# Patient Record
Sex: FEMALE | Race: WHITE | NOT HISPANIC OR LATINO | URBAN - METROPOLITAN AREA
[De-identification: names, ages, dates, MRNs, and addresses within clinical notes are randomized per-mention and may not be internally consistent; named-entity substitution may affect disease eponyms.]

---

## 2017-12-15 LAB — POC BETA-HCG (QUAL): NEGATIVE

## 2019-12-06 LAB — POC BETA-HCG (QUAL): NEGATIVE

## 2022-04-11 ENCOUNTER — HISTORICAL (OUTPATIENT)
Dept: ADMINISTRATIVE | Facility: HOSPITAL | Age: 32
End: 2022-04-11

## 2022-04-29 VITALS
DIASTOLIC BLOOD PRESSURE: 88 MMHG | BODY MASS INDEX: 18.19 KG/M2 | SYSTOLIC BLOOD PRESSURE: 134 MMHG | HEIGHT: 61 IN | WEIGHT: 96.31 LBS

## 2022-08-30 PROBLEM — Z34.90 SUPERVISION OF NORMAL PREGNANCY: Status: ACTIVE | Noted: 2022-08-30

## 2022-08-30 PROBLEM — Z98.891 HISTORY OF 2 CESAREAN SECTIONS: Status: ACTIVE | Noted: 2022-08-30

## 2022-08-30 PROBLEM — F17.200 SMOKER: Status: ACTIVE | Noted: 2022-08-30

## 2022-08-30 PROBLEM — J34.89 NASAL MASS: Status: ACTIVE | Noted: 2022-08-30

## 2022-09-22 ENCOUNTER — HISTORICAL (OUTPATIENT)
Dept: ADMINISTRATIVE | Facility: HOSPITAL | Age: 32
End: 2022-09-22

## 2023-06-01 ENCOUNTER — HOSPITAL ENCOUNTER (EMERGENCY)
Facility: HOSPITAL | Age: 33
Discharge: HOME | End: 2023-06-01
Attending: EMERGENCY MEDICINE
Payer: MEDICAID

## 2023-06-01 VITALS
SYSTOLIC BLOOD PRESSURE: 129 MMHG | OXYGEN SATURATION: 97 % | TEMPERATURE: 98 F | BODY MASS INDEX: 21.34 KG/M2 | HEIGHT: 64 IN | DIASTOLIC BLOOD PRESSURE: 88 MMHG | RESPIRATION RATE: 16 BRPM | HEART RATE: 92 BPM | WEIGHT: 125 LBS

## 2023-06-01 DIAGNOSIS — S39.91XA BLUNT TRAUMATIC INJURY OF THORACO-ABDOMINO-PELVIC REGION: Primary | ICD-10-CM

## 2023-06-01 DIAGNOSIS — T14.90XA TRAUMA: ICD-10-CM

## 2023-06-01 DIAGNOSIS — S80.02XA CONTUSION OF LEFT KNEE, INITIAL ENCOUNTER: ICD-10-CM

## 2023-06-01 DIAGNOSIS — S39.93XA BLUNT TRAUMATIC INJURY OF THORACO-ABDOMINO-PELVIC REGION: Primary | ICD-10-CM

## 2023-06-01 DIAGNOSIS — S29.9XXA BLUNT TRAUMATIC INJURY OF THORACO-ABDOMINO-PELVIC REGION: Primary | ICD-10-CM

## 2023-06-01 DIAGNOSIS — F10.920 ALCOHOLIC INTOXICATION WITHOUT COMPLICATION: ICD-10-CM
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
## 2023-06-01 LAB
ALBUMIN SERPL-MCNC: 4.4 G/DL (ref 3.5–5)
ALBUMIN/GLOB SERPL: 1.3 RATIO (ref 1.1–2)
ALP SERPL-CCNC: 82 UNIT/L (ref 40–150)
ALT SERPL-CCNC: 31 UNIT/L (ref 0–55)
APAP SERPL-MCNC: <17.4 UG/ML (ref 17.4–30)
APPEARANCE UR: CLEAR
AST SERPL-CCNC: 33 UNIT/L (ref 5–34)
B-HCG SERPL QL: NEGATIVE
BACTERIA #/AREA URNS AUTO: NORMAL /HPF
BASOPHILS # BLD AUTO: 0.07 X10(3)/MCL
BASOPHILS NFR BLD AUTO: 0.5 %
BILIRUB UR QL STRIP.AUTO: NEGATIVE MG/DL
BILIRUBIN DIRECT+TOT PNL SERPL-MCNC: 0.2 MG/DL
BUN SERPL-MCNC: 11.3 MG/DL (ref 7–18.7)
CALCIUM SERPL-MCNC: 9.8 MG/DL (ref 8.4–10.2)
CHLORIDE SERPL-SCNC: 110 MMOL/L (ref 98–107)
CO2 SERPL-SCNC: 19 MMOL/L (ref 22–29)
COLOR UR: YELLOW
CREAT SERPL-MCNC: 0.75 MG/DL (ref 0.55–1.02)
EOSINOPHIL # BLD AUTO: 0.14 X10(3)/MCL (ref 0–0.9)
EOSINOPHIL NFR BLD AUTO: 1 %
ERYTHROCYTE [DISTWIDTH] IN BLOOD BY AUTOMATED COUNT: 13.6 % (ref 11.5–17)
ETHANOL SERPL-MCNC: 144 MG/DL
GFR SERPLBLD CREATININE-BSD FMLA CKD-EPI: >60 MLS/MIN/1.73/M2
GLOBULIN SER-MCNC: 3.3 GM/DL (ref 2.4–3.5)
GLUCOSE SERPL-MCNC: 113 MG/DL (ref 74–100)
GLUCOSE UR QL STRIP.AUTO: NEGATIVE MG/DL
HCT VFR BLD AUTO: 42.1 % (ref 37–47)
HGB BLD-MCNC: 14.2 G/DL (ref 12–16)
IMM GRANULOCYTES # BLD AUTO: 0.04 X10(3)/MCL (ref 0–0.04)
IMM GRANULOCYTES NFR BLD AUTO: 0.3 %
KETONES UR QL STRIP.AUTO: NEGATIVE MG/DL
LEUKOCYTE ESTERASE UR QL STRIP.AUTO: NEGATIVE UNIT/L
LYMPHOCYTES # BLD AUTO: 5.58 X10(3)/MCL (ref 0.6–4.6)
LYMPHOCYTES NFR BLD AUTO: 41.2 %
MCH RBC QN AUTO: 31.4 PG (ref 27–31)
MCHC RBC AUTO-ENTMCNC: 33.7 G/DL (ref 33–36)
MCV RBC AUTO: 93.1 FL (ref 80–94)
MONOCYTES # BLD AUTO: 0.78 X10(3)/MCL (ref 0.1–1.3)
MONOCYTES NFR BLD AUTO: 5.8 %
NEUTROPHILS # BLD AUTO: 6.92 X10(3)/MCL (ref 2.1–9.2)
NEUTROPHILS NFR BLD AUTO: 51.2 %
NITRITE UR QL STRIP.AUTO: NEGATIVE
NRBC BLD AUTO-RTO: 0 %
PH UR STRIP.AUTO: 7 [PH]
PLATELET # BLD AUTO: 341 X10(3)/MCL (ref 130–400)
PMV BLD AUTO: 10.5 FL (ref 7.4–10.4)
POTASSIUM SERPL-SCNC: 4 MMOL/L (ref 3.5–5.1)
PROT SERPL-MCNC: 7.7 GM/DL (ref 6.4–8.3)
PROT UR QL STRIP.AUTO: NEGATIVE MG/DL
RBC # BLD AUTO: 4.52 X10(6)/MCL (ref 4.2–5.4)
RBC #/AREA URNS AUTO: NORMAL /HPF
RBC UR QL AUTO: NEGATIVE UNIT/L
SODIUM SERPL-SCNC: 141 MMOL/L (ref 136–145)
SP GR UR STRIP.AUTO: 1 (ref 1–1.03)
SQUAMOUS #/AREA URNS AUTO: NORMAL /HPF
TSH SERPL-ACNC: 2.58 UIU/ML (ref 0.35–4.94)
UROBILINOGEN UR STRIP-ACNC: 0.2 MG/DL
WBC # SPEC AUTO: 13.53 X10(3)/MCL (ref 4.5–11.5)
WBC #/AREA URNS AUTO: NORMAL /HPF

## 2023-06-01 PROCEDURE — 82077 ASSAY SPEC XCP UR&BREATH IA: CPT | Performed by: EMERGENCY MEDICINE

## 2023-06-01 PROCEDURE — 96374 THER/PROPH/DIAG INJ IV PUSH: CPT

## 2023-06-01 PROCEDURE — 63600175 PHARM REV CODE 636 W HCPCS: Performed by: EMERGENCY MEDICINE

## 2023-06-01 PROCEDURE — 99285 EMERGENCY DEPT VISIT HI MDM: CPT | Mod: 25

## 2023-06-01 PROCEDURE — 85025 COMPLETE CBC W/AUTO DIFF WBC: CPT | Performed by: EMERGENCY MEDICINE

## 2023-06-01 PROCEDURE — 80053 COMPREHEN METABOLIC PANEL: CPT | Performed by: EMERGENCY MEDICINE

## 2023-06-01 PROCEDURE — 84443 ASSAY THYROID STIM HORMONE: CPT | Performed by: EMERGENCY MEDICINE

## 2023-06-01 PROCEDURE — 25500020 PHARM REV CODE 255: Performed by: EMERGENCY MEDICINE

## 2023-06-01 PROCEDURE — 80143 DRUG ASSAY ACETAMINOPHEN: CPT | Performed by: EMERGENCY MEDICINE

## 2023-06-01 PROCEDURE — 81001 URINALYSIS AUTO W/SCOPE: CPT | Mod: 59 | Performed by: EMERGENCY MEDICINE

## 2023-06-01 PROCEDURE — 81025 URINE PREGNANCY TEST: CPT | Performed by: EMERGENCY MEDICINE

## 2023-06-01 RX ORDER — KETOROLAC TROMETHAMINE 10 MG/1
10 TABLET, FILM COATED ORAL EVERY 6 HOURS PRN
Qty: 20 TABLET | Refills: 0 | Status: SHIPPED | OUTPATIENT
Start: 2023-06-01 | End: 2023-06-06

## 2023-06-01 RX ORDER — METHOCARBAMOL 500 MG/1
1000 TABLET, FILM COATED ORAL 3 TIMES DAILY
Qty: 30 TABLET | Refills: 0 | Status: SHIPPED | OUTPATIENT
Start: 2023-06-01 | End: 2023-06-06

## 2023-06-01 RX ORDER — LORAZEPAM 2 MG/ML
1 INJECTION INTRAMUSCULAR
Status: COMPLETED | OUTPATIENT
Start: 2023-06-01 | End: 2023-06-01

## 2023-06-01 RX ADMIN — LORAZEPAM 1 MG: 2 INJECTION INTRAMUSCULAR; INTRAVENOUS at 04:06

## 2023-06-01 RX ADMIN — IOPAMIDOL 100 ML: 755 INJECTION, SOLUTION INTRAVENOUS at 05:06
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
## 2024-07-13 PROBLEM — F39 UNSPECIFIED MOOD (AFFECTIVE) DISORDER: Status: ACTIVE | Noted: 2024-07-13

## 2024-07-30 DIAGNOSIS — Z32.01 POSITIVE PREGNANCY TEST: Primary | ICD-10-CM

## 2024-08-08 ENCOUNTER — PATIENT MESSAGE (OUTPATIENT)
Dept: OBSTETRICS AND GYNECOLOGY | Facility: CLINIC | Age: 34
End: 2024-08-08
Payer: COMMERCIAL

## 2024-08-12 ENCOUNTER — PATIENT MESSAGE (OUTPATIENT)
Dept: URGENT CARE | Facility: CLINIC | Age: 34
End: 2024-08-12
Payer: COMMERCIAL

## 2024-08-13 ENCOUNTER — CLINICAL SUPPORT (OUTPATIENT)
Dept: OBSTETRICS AND GYNECOLOGY | Facility: CLINIC | Age: 34
End: 2024-08-13
Payer: COMMERCIAL

## 2024-08-13 DIAGNOSIS — Z71.9 COUNSELED BY NURSE: Primary | ICD-10-CM

## 2024-08-13 NOTE — PROGRESS NOTES
Spoke with patient , went to er yesterday said , she was told she has a dead fetus inside her , she wants to be seen at [facility] , to get a second opinion , has an appointment at [facility] Thursday but wants to keep her scheduled appointment on Thursday. Advised to go to the er to be re-evaluated . Patient said she had court tomorrow and will be in [place], will go once she returns home

## 2024-08-20 ENCOUNTER — HOSPITAL ENCOUNTER (EMERGENCY)
Facility: OTHER | Age: 34
Discharge: HOME | End: 2024-08-20
Attending: EMERGENCY MEDICINE
Payer: COMMERCIAL

## 2024-08-20 VITALS
DIASTOLIC BLOOD PRESSURE: 60 MMHG | SYSTOLIC BLOOD PRESSURE: 110 MMHG | HEART RATE: 77 BPM | RESPIRATION RATE: 19 BRPM | OXYGEN SATURATION: 97 % | TEMPERATURE: 98 F

## 2024-08-20 DIAGNOSIS — O03.9 MISCARRIAGE: ICD-10-CM

## 2024-08-20 DIAGNOSIS — O20.9 VAGINAL BLEEDING IN PREGNANCY, FIRST TRIMESTER: Primary | ICD-10-CM

## 2024-08-20 LAB
ALBUMIN SERPL BCP-MCNC: 3.4 G/DL (ref 3.5–5.2)
ALP SERPL-CCNC: 58 U/L (ref 55–135)
ALT SERPL W/O P-5'-P-CCNC: 15 U/L (ref 10–44)
ANION GAP SERPL CALC-SCNC: 8 MMOL/L (ref 8–16)
AST SERPL-CCNC: 17 U/L (ref 10–40)
BASOPHILS # BLD AUTO: 0.04 K/UL (ref 0–0.2)
BASOPHILS NFR BLD: 0.3 % (ref 0–1.9)
BILIRUB SERPL-MCNC: 0.3 MG/DL (ref 0.1–1)
BUN SERPL-MCNC: 10 MG/DL (ref 6–20)
CALCIUM SERPL-MCNC: 8.7 MG/DL (ref 8.7–10.5)
CHLORIDE SERPL-SCNC: 110 MMOL/L (ref 95–110)
CO2 SERPL-SCNC: 21 MMOL/L (ref 23–29)
CREAT SERPL-MCNC: 0.6 MG/DL (ref 0.5–1.4)
DIFFERENTIAL METHOD BLD: ABNORMAL
EOSINOPHIL # BLD AUTO: 0.2 K/UL (ref 0–0.5)
EOSINOPHIL NFR BLD: 1.6 % (ref 0–8)
ERYTHROCYTE [DISTWIDTH] IN BLOOD BY AUTOMATED COUNT: 13.6 % (ref 11.5–14.5)
EST. GFR  (NO RACE VARIABLE): >60 ML/MIN/1.73 M^2
GLUCOSE SERPL-MCNC: 89 MG/DL (ref 70–110)
HCG INTACT+B SERPL-ACNC: 1934 MIU/ML
HCT VFR BLD AUTO: 37 % (ref 37–48.5)
HGB BLD-MCNC: 12.8 G/DL (ref 12–16)
IMM GRANULOCYTES # BLD AUTO: 0.04 K/UL (ref 0–0.04)
IMM GRANULOCYTES NFR BLD AUTO: 0.3 % (ref 0–0.5)
LYMPHOCYTES # BLD AUTO: 3.8 K/UL (ref 1–4.8)
LYMPHOCYTES NFR BLD: 30.2 % (ref 18–48)
MCH RBC QN AUTO: 32.2 PG (ref 27–31)
MCHC RBC AUTO-ENTMCNC: 34.6 G/DL (ref 32–36)
MCV RBC AUTO: 93 FL (ref 82–98)
MONOCYTES # BLD AUTO: 1 K/UL (ref 0.3–1)
MONOCYTES NFR BLD: 8 % (ref 4–15)
NEUTROPHILS # BLD AUTO: 7.5 K/UL (ref 1.8–7.7)
NEUTROPHILS NFR BLD: 59.6 % (ref 38–73)
NRBC BLD-RTO: 0 /100 WBC
PLATELET # BLD AUTO: 329 K/UL (ref 150–450)
PMV BLD AUTO: 9.5 FL (ref 9.2–12.9)
POTASSIUM SERPL-SCNC: 3.7 MMOL/L (ref 3.5–5.1)
PROT SERPL-MCNC: 6.4 G/DL (ref 6–8.4)
RBC # BLD AUTO: 3.97 M/UL (ref 4–5.4)
SODIUM SERPL-SCNC: 139 MMOL/L (ref 136–145)
WBC # BLD AUTO: 12.63 K/UL (ref 3.9–12.7)

## 2024-08-20 PROCEDURE — 84702 CHORIONIC GONADOTROPIN TEST: CPT | Performed by: EMERGENCY MEDICINE

## 2024-08-20 PROCEDURE — 99285 EMERGENCY DEPT VISIT HI MDM: CPT | Mod: 25

## 2024-08-20 PROCEDURE — 96374 THER/PROPH/DIAG INJ IV PUSH: CPT

## 2024-08-20 PROCEDURE — 96375 TX/PRO/DX INJ NEW DRUG ADDON: CPT

## 2024-08-20 PROCEDURE — 80053 COMPREHEN METABOLIC PANEL: CPT | Performed by: EMERGENCY MEDICINE

## 2024-08-20 PROCEDURE — 88305 TISSUE EXAM BY PATHOLOGIST: CPT | Mod: 26,,, | Performed by: PATHOLOGY

## 2024-08-20 PROCEDURE — 88305 TISSUE EXAM BY PATHOLOGIST: CPT | Performed by: PATHOLOGY

## 2024-08-20 PROCEDURE — 25000003 PHARM REV CODE 250: Performed by: EMERGENCY MEDICINE

## 2024-08-20 PROCEDURE — 85025 COMPLETE CBC W/AUTO DIFF WBC: CPT | Performed by: EMERGENCY MEDICINE

## 2024-08-20 PROCEDURE — 63600175 PHARM REV CODE 636 W HCPCS: Performed by: EMERGENCY MEDICINE

## 2024-08-20 PROCEDURE — 96361 HYDRATE IV INFUSION ADD-ON: CPT

## 2024-08-20 RX ORDER — MISOPROSTOL 200 UG/1
800 TABLET ORAL DAILY PRN
Qty: 8 TABLET | Refills: 0 | Status: SHIPPED | OUTPATIENT
Start: 2024-08-20

## 2024-08-20 RX ORDER — OXYCODONE AND ACETAMINOPHEN 5; 325 MG/1; MG/1
1 TABLET ORAL EVERY 8 HOURS PRN
Qty: 9 TABLET | Refills: 0 | Status: SHIPPED | OUTPATIENT
Start: 2024-08-20

## 2024-08-20 RX ORDER — ONDANSETRON 4 MG/1
4 TABLET, ORALLY DISINTEGRATING ORAL EVERY 8 HOURS PRN
Qty: 10 TABLET | Refills: 0 | Status: SHIPPED | OUTPATIENT
Start: 2024-08-20

## 2024-08-20 RX ORDER — ONDANSETRON 4 MG/1
4 TABLET, ORALLY DISINTEGRATING ORAL EVERY 6 HOURS PRN
Qty: 10 TABLET | Refills: 0 | Status: SHIPPED | OUTPATIENT
Start: 2024-08-20 | End: 2024-08-20

## 2024-08-20 RX ORDER — MISOPROSTOL 200 UG/1
800 TABLET ORAL DAILY PRN
Qty: 8 TABLET | Refills: 0 | Status: SHIPPED | OUTPATIENT
Start: 2024-08-20 | End: 2024-08-20

## 2024-08-20 RX ORDER — IBUPROFEN 600 MG/1
600 TABLET ORAL EVERY 6 HOURS PRN
Qty: 30 TABLET | Refills: 1 | Status: SHIPPED | OUTPATIENT
Start: 2024-08-20 | End: 2024-08-20

## 2024-08-20 RX ORDER — KETOROLAC TROMETHAMINE 30 MG/ML
10 INJECTION, SOLUTION INTRAMUSCULAR; INTRAVENOUS
Status: COMPLETED | OUTPATIENT
Start: 2024-08-20 | End: 2024-08-20

## 2024-08-20 RX ORDER — IBUPROFEN 600 MG/1
600 TABLET ORAL EVERY 8 HOURS PRN
Qty: 20 TABLET | Refills: 0 | Status: SHIPPED | OUTPATIENT
Start: 2024-08-20

## 2024-08-20 RX ORDER — MORPHINE SULFATE 4 MG/ML
4 INJECTION, SOLUTION INTRAMUSCULAR; INTRAVENOUS
Status: COMPLETED | OUTPATIENT
Start: 2024-08-20 | End: 2024-08-20

## 2024-08-20 RX ADMIN — KETOROLAC TROMETHAMINE 10 MG: 30 INJECTION, SOLUTION INTRAMUSCULAR; INTRAVENOUS at 01:08

## 2024-08-20 RX ADMIN — SODIUM CHLORIDE 1000 ML: 9 INJECTION, SOLUTION INTRAVENOUS at 01:08

## 2024-08-20 RX ADMIN — MORPHINE SULFATE 4 MG: 4 INJECTION, SOLUTION INTRAMUSCULAR; INTRAVENOUS at 01:08

## 2024-08-20 NOTE — ED PROVIDER NOTES
Encounter Date: 2024    SCRIBE #1 NOTE: I, Gini Rader, am scribing for, and in the presence of,  Javi Langley MD. I have scribed the following portions of the note - Other sections scribed: HPI, ROS, PE.       History     Chief Complaint   Patient presents with    Vaginal Bleeding     Pt 10 weeks pregnant and reporting spotting x 1 day that has progressed to bright red bleeding. Denies passing clots. Pt also reporting lower abdominal pain. A1.      Time seen by provider: 12:59 PM    This is a 33 y.o. female  who presents with complaint of vaginal bleeding and cramping. The patient states that she was seen 8 days ago at [facility] for lower abdominal cramping and was informed she was likely having a miscarriage. She states that last night she began having continued cramping and new onset of bleeding and decided to come into the ED today. She states that she is also passing clots that started on arrival here. She notes that she is in a significant amount of pain that she describes as feeling like contractions. She denies having any trauma to the area that could have caused her symptoms. This is the extent of the patient's complaints at this time.    The history is provided by the patient.     Review of patient's allergies indicates:  No Known Allergies  Past Medical History:   Diagnosis Date    Nasal mass      Past Surgical History:   Procedure Laterality Date    AUGMENTATION OF BREAST  2022     SECTION      x2     No family history on file.  Social History     Tobacco Use    Smoking status: Former     Types: Cigarettes    Smokeless tobacco: Never   Substance Use Topics    Alcohol use: Never    Drug use: Never     Review of Systems   Constitutional:  Negative for fever.   HENT:  Negative for congestion.    Eyes:  Negative for redness.   Respiratory:  Negative for shortness of breath.    Cardiovascular:  Negative for chest pain.   Gastrointestinal:  Negative for abdominal pain.    Genitourinary:  Positive for pelvic pain and vaginal bleeding. Negative for dysuria.   Skin:  Negative for rash.   Neurological:  Negative for headaches.   Psychiatric/Behavioral:  Negative for confusion.        Physical Exam     Initial Vitals [08/20/24 1220]   BP Pulse Resp Temp SpO2   106/66 88 16 98.2 °F (36.8 °C) 98 %      MAP       --         Physical Exam    Constitutional: She appears well-developed and well-nourished. She is not diaphoretic.   Mild distress.    HENT:   Head: Normocephalic and atraumatic.   Eyes: Conjunctivae are normal.   Neck: Neck supple.   Cardiovascular:  Normal rate, regular rhythm, S1 normal, S2 normal, normal heart sounds and intact distal pulses.           No murmur heard.  Pulmonary/Chest: Breath sounds normal. No respiratory distress. She has no wheezes. She has no rhonchi. She has no rales.   Abdominal: Abdomen is soft.   Lower abdomen with mild tenderness. There is no rebound and no guarding.   Genitourinary:    Genitourinary Comments: Pelvic with chaperone: active bleeding from cervix with multiple clots in the vaginal vault.      Musculoskeletal:         General: No edema.      Cervical back: Neck supple.     Neurological: She is alert and oriented to person, place, and time.   Skin: Skin is warm and dry.         ED Course   Procedures  Labs Reviewed   CBC W/ AUTO DIFFERENTIAL - Abnormal       Result Value    WBC 12.63      RBC 3.97 (*)     Hemoglobin 12.8      Hematocrit 37.0      MCV 93      MCH 32.2 (*)     MCHC 34.6      RDW 13.6      Platelets 329      MPV 9.5      Immature Granulocytes 0.3      Gran # (ANC) 7.5      Immature Grans (Abs) 0.04      Lymph # 3.8      Mono # 1.0      Eos # 0.2      Baso # 0.04      nRBC 0      Gran % 59.6      Lymph % 30.2      Mono % 8.0      Eosinophil % 1.6      Basophil % 0.3      Differential Method Automated      Narrative:     Release to patient->Immediate   COMPREHENSIVE METABOLIC PANEL - Abnormal    Sodium 139      Potassium 3.7       Chloride 110      CO2 21 (*)     Glucose 89      BUN 10      Creatinine 0.6      Calcium 8.7      Total Protein 6.4      Albumin 3.4 (*)     Total Bilirubin 0.3      Alkaline Phosphatase 58      AST 17      ALT 15      eGFR >60      Anion Gap 8      Narrative:     Release to patient->Immediate   HCG, QUANTITATIVE    HCG Quant 1934      Narrative:     Release to patient->Immediate   URINALYSIS, REFLEX TO URINE CULTURE   SPECIMEN TO PATHOLOGY, SURGERY          Imaging Results               US OB <14 Wks TransAbd & TransVag, Single Gestation (XPD) (Final result)  Result time 08/20/24 14:53:27      Final result by Juliane Conn MD (08/20/24 14:53:27)                   Impression:      Presumed irregularly-shaped gestational sac with echogenic debris.  Mean sac diameter corresponds to a 7 week, 5 day gestation.  No definite fetal pole or yolk sac.  Suspected failed intrauterine pregnancy in this patient with active vaginal bleeding at the time of the exam.  Serial beta HCG measurements recommended.    Suspected subchorionic hemorrhage, 3.2 cm.    This report was flagged in Epic as abnormal.      Electronically signed by: Juliane Conn  Date:    08/20/2024  Time:    14:53               Narrative:    EXAMINATION:  US OB <14 WEEKS, TRANSABDOM & TRANSVAG, SINGLE GESTATION (XPD)    CLINICAL HISTORY:  Miscarriage, vaginal bleeding;    TECHNIQUE:  Transabdominal sonography of the pelvis was performed, followed by transvaginal sonography to better evaluate the uterus and ovaries.    COMPARISON:  None.    FINDINGS:  Intrauterine gestation(s): Single    Mean gestational sac diameter: 2.5 cm.  Irregular shape with echogenic debris within.    Yolk sac: Not visualized    Crown-rump length (CRL): Not visualized    Subchorionic hemorrhage: Suspected subchorionic hemorrhage measuring 3.2 cm.    Right ovary: Normal.    Left ovary: Normal.    Miscellaneous: No Free Fluid.                                       Medications    sodium chloride 0.9% bolus 1,000 mL 1,000 mL (0 mLs Intravenous Stopped 24 1550)   ketorolac injection 9.999 mg (9.999 mg Intravenous Given 24 1302)   morphine injection 4 mg (4 mg Intravenous Given 24 1303)     Medical Decision Making      33-year-old female  approximately 10 weeks pregnant by dates presents for evaluation of vaginal bleeding.  She started having lower abdominal cramping about 3 weeks ago, was seen at [facility] on  and found to have IUP at 5 weeks with no fetal heart tone.  She then went to [facility] again on  for persistent lower abdominal pain/cramping and ultrasound with IUP at 6 weeks 5 days, but again no fetal heart tones.  Her beta-hCG level dropped and this was concerning for fetal demise/failed pregnancy.  She has had lower abdominal cramping since then, but last night started having bleeding as well, that became heavier today and soon after ED arrival she started passing clots as well.  She denies any trauma or fevers, no other new complaints.  On arrival patient tearful and in mild distress due to active abdominal cramping and passing clots; she was hemodynamically stable with no tachycardia, denies any comorbidities or medications taken recently.  She does have mild lower abdominal cramping.    Differential diagnosis includes fetal demise, incomplete miscarriage, anemia.  Pelvic exam done after patient given IV Toradol/morphine, which she was agreeable to getting for pain after risks and benefits discussed and that this is likely a failed pregnancy.  Multiple clots evacuated from vagina and os open with oozing of blood.      Labs with no anemia, hemoglobin 12.8; normal CMP.  Beta-hCG significantly decreased from previous, now at 1934.  Ultrasound shows presumed irregularly shaped gestational sac with echogenic debris, with size corresponding to 7 weeks 5 days with no fetal heart activity or definite fetal pole/yolk sac.  Constellation of findings consistent with  active miscarriage and fetal demise.  Ob/gyn consulted and discussed options with patient, and she prefers trial of Cytotec since she has a 13-year-old son at home and can not be admitted for D and C at this time.  Gyn asked me to prescribe Cytotec, 1 dose vaginally when she gets home, and another dose 24 hours later.  Patient is comfortable with self administering Cytotec, was also advised by gyn to take ibuprofen for pain before taking Cytotec, and they also prescribed Zofran as needed.  Will also Rx oxycodone for severe pain to help during miscarriage process, and she understands return to the ED for any severe pain or persistent bleeding, or any other concerns.  She has OB follow up as outpatient in 2 days, comfortable with discharge plan and return precautions.  Of note, blood clots pulled out during pelvic exam were sent to pathology for evaluation to see if they are part of products of conception.      Amount and/or Complexity of Data Reviewed  External Data Reviewed: notes.  Labs: ordered. Decision-making details documented in ED Course.  Radiology: ordered.    Risk  Prescription drug management.            Scribe Attestation:   Scribe #1: I performed the above scribed service and the documentation accurately describes the services I performed. I attest to the accuracy of the note.              I, Dr. Javi Langley, personally performed the services described in this documentation. All medical record entries made by the scribe were at my direction and in my presence.  I have reviewed the chart and agree that the record reflects my personal performance and is accurate and complete. Javi Langley MD.                    Clinical Impression:  Final diagnoses:  [O20.9] Vaginal bleeding in pregnancy, first trimester (Primary)  [O03.9] Miscarriage          ED Disposition Condition    Discharge Stable          ED Prescriptions       Medication Sig Dispense Start Date End Date Auth. Provider    ibuprofen  (ADVIL,MOTRIN) 600 MG tablet  (Status: Discontinued) Take 1 tablet (600 mg total) by mouth every 6 (six) hours as needed for Pain. 30 tablet 8/20/2024 8/20/2024 Sol Miramontes MD    ondansetron (ZOFRAN-ODT) 4 MG TbDL  (Status: Discontinued) Take 1 tablet (4 mg total) by mouth every 6 (six) hours as needed. 10 tablet 8/20/2024 8/20/2024 Sol Miramontes MD    ondansetron (ZOFRAN-ODT) 4 MG TbDL Take 1 tablet (4 mg total) by mouth every 8 (eight) hours as needed (Nausea). 10 tablet 8/20/2024 -- Javi Langley MD    ibuprofen (ADVIL,MOTRIN) 600 MG tablet Take 1 tablet (600 mg total) by mouth every 8 (eight) hours as needed for Pain. 20 tablet 8/20/2024 -- Javi Langley MD    miSOPROStoL (CYTOTEC) 200 MCG Tab  (Status: Discontinued) Take 4 tablets (800 mcg total) by mouth daily as needed (Miscarriage). Take 4 tablets today, and another 4 tablets 24 hours after 1st dose 8 tablet 8/20/2024 8/20/2024 Javi Langley MD    oxyCODONE-acetaminophen (PERCOCET) 5-325 mg per tablet Take 1 tablet by mouth every 8 (eight) hours as needed for Pain. 9 tablet 8/20/2024 -- Javi Langley MD    miSOPROStoL (CYTOTEC) 200 MCG Tab Place 4 tablets (800 mcg total) vaginally daily as needed (Miscarriage). Take 4 tablets today, and another 4 tablets 24 hours after 1st dose 8 tablet 8/20/2024 -- Javi Langley MD          Follow-up Information       Follow up With Specialties Details Why Contact Info Additional Information    [facility] Go in 2 days  [address] [address] [phone] Turn at Entrance 1 on [facility] in [facility] and take elevators to Floor 2. Follow signs to [facility]. Take [place] Elevators to Floor 6 for [address].    [facility] Emergency Medicine Go to  If symptoms worsen [address]  [facility] [address] [phone]              Javi Langley,  MD  08/20/24 1817       Javi Langley MD  08/20/24 1817

## 2024-08-20 NOTE — ED TRIAGE NOTES
Pt reporting spotting that started last night that has increased to bright red bleeding today. Pt reports going through multiple rolls of toilet paper and towels at home. Pt denies passing clots prior. Large clots noted at this time. Pt seen at [facility] on 8/12 and diagnosed with miscarriage. Pt states she was supposed to have appointment with OB today.

## 2024-08-20 NOTE — CONSULTS
[facility] Emergency Dept  Obstetrics & Gynecology  Consult Note    Patient Name: Alka Torres  MRN: [id number]  Admission Date: 2024  Hospital Length of Stay: 0 days  Code Status: No Order  Primary Care Provider: No, Primary Doctor  Principal Problem: <principal problem not specified>    Inpatient consult to Obstetrics  Consult performed by: Sol Miramontes MD  Consult ordered by: Javi Langley MD        Subjective:     Chief Complaint: vaginal bleeding    History of Present Illness:   32 yo  presents with vaginal bleeding since last night in setting of incomplete ab.    Patient notes bleeding began spontaneously last night while lying in bed. She does not recall exact pad counts as she needed to use a towel to control the bleeding. Bleeding was bright red, no passage of pink tissue. She states she has been having cramping on and off since the beginning of August, but no bleeding until last night.     She presented to [facility]  for cramping. Ultrasound showed gestational sac and fetus with crown rump length of 7.9mm with estimated age 6w5d. No bleeding at that time.     She then presented again to [facility]  for cramping. Ultrasound showed gestational sac, yolk sac and fetus  measuring 7.8 mm with estimated age 6w5d with no FHT. No bleeding at that time.     Repeat ultrasound in ED here with gestational sac of echogenic debris, measured at 2.5 cm. No fetal pole or yolk sac.       OBGYN hx:   x 2  Missed Ab last year   No hx of STD  Hx of abnormal pap, however pt denies need for LEEP or CKC        No current facility-administered medications on file prior to encounter.     Current Outpatient Medications on File Prior to Encounter   Medication Sig    aspirin (ECOTRIN) 81 MG EC tablet Take 2 tablets (162 mg total) by mouth once daily. Starting at 12 weeks gestation    prenatal vit no.130-iron-folic (PRENATAL VITAMIN) 27 mg iron- 800 mcg Tab Take 1 tablet by mouth once  daily.       Review of patient's allergies indicates:  No Known Allergies    Past Medical History:   Diagnosis Date    Nasal mass      OB History    Para Term  AB Living   4 2 2 0 0 2   SAB IAB Ectopic Multiple Live Births   0 0 0 0 2      # Outcome Date GA Lbr Sean/2nd Weight Sex Type Anes PTL Lv   4 Current            3 Term 12/30/10   3.289 kg (7 lb 4 oz)  CS-Unspec   ANA MARÍA   2 Term 09   2.92 kg (6 lb 7 oz)  CS-Unspec   ANA MARÍA   1               Past Surgical History:   Procedure Laterality Date    AUGMENTATION OF BREAST  2022     SECTION      x2     Family History    None       Tobacco Use    Smoking status: Former     Types: Cigarettes    Smokeless tobacco: Never   Substance and Sexual Activity    Alcohol use: Never    Drug use: Never    Sexual activity: Yes     Review of Systems   Constitutional:  Negative for chills and fever.   Respiratory:  Negative for shortness of breath.    Cardiovascular:  Negative for chest pain.   Gastrointestinal:  Positive for abdominal pain.   Genitourinary:  Positive for vaginal bleeding.     Objective:     Vital Signs (Most Recent):  Temp: 98.2 °F (36.8 °C) (24 1335)  Pulse: 82 (24 1530)  Resp: 19 (24 1335)  BP: 112/60 (24 1530)  SpO2: 100 % (24 1530) Vital Signs (24h Range):  Temp:  [98.2 °F (36.8 °C)] 98.2 °F (36.8 °C)  Pulse:  [73-88] 82  Resp:  [16-19] 19  SpO2:  [98 %-100 %] 100 %  BP: (106-115)/(59-66) 112/60        There is no height or weight on file to calculate BMI.  Patient's last menstrual period was 2024.    Physical Exam:   Constitutional: She is oriented to person, place, and time. She appears well-developed and well-nourished.    HENT:   Head: Normocephalic and atraumatic.    Eyes: EOM are normal.      Pulmonary/Chest: Effort normal.        Abdominal: Soft. There is abdominal tenderness. There is no rebound and no guarding.     Genitourinary:    Genitourinary Comments: Speculum exam deferred to ED  provider. Moderate bleeding from cervix with multiple clots in the vaginal vault.                  Neurological: She is alert and oriented to person, place, and time. No cranial nerve deficit.    Skin: Skin is warm and dry.    Psychiatric: She has a normal mood and affect.       Laboratory:  Recent Labs   Lab 08/20/24  1307   WBC 12.63   HGB 12.8   HCT 37.0   MCV 93             Diagnostic Results:  Results for orders placed or performed during the hospital encounter of 08/20/24   US OB <14 Wks TransAbd & TransVag, Single Gestation (XPD)    Narrative    EXAMINATION:  US OB <14 WEEKS, TRANSABDOM & TRANSVAG, SINGLE GESTATION (XPD)    CLINICAL HISTORY:  Miscarriage, vaginal bleeding;    TECHNIQUE:  Transabdominal sonography of the pelvis was performed, followed by transvaginal sonography to better evaluate the uterus and ovaries.    COMPARISON:  None.    FINDINGS:  Intrauterine gestation(s): Single    Mean gestational sac diameter: 2.5 cm.  Irregular shape with echogenic debris within.    Yolk sac: Not visualized    Crown-rump length (CRL): Not visualized    Subchorionic hemorrhage: Suspected subchorionic hemorrhage measuring 3.2 cm.    Right ovary: Normal.    Left ovary: Normal.    Miscellaneous: No Free Fluid.      Impression    Presumed irregularly-shaped gestational sac with echogenic debris.  Mean sac diameter corresponds to a 7 week, 5 day gestation.  No definite fetal pole or yolk sac.  Suspected failed intrauterine pregnancy in this patient with active vaginal bleeding at the time of the exam.  Serial beta HCG measurements recommended.    Suspected subchorionic hemorrhage, 3.2 cm.    This report was flagged in Epic as abnormal.      Electronically signed by: Juliane Conn  Date:    08/20/2024  Time:    14:53           Assessment/Plan:     There are no hospital problems to display for this patient.    1) Incomplete Ab   - Patient with vaginal bleeding x 1 day in setting on incomplete ab at 6w5d  - VSS,  CBC H/H 12.8/37  - Beta-hcg 1900 today, beta-hcg 81397 on 8/12  - On exam, abdomen is per ED attending, blood clot present, not actively bleeding  - Ultrasound from 8/12 showing single gestational sac is seen within the uterus.  A yolk sac and embryo are present. The crown-rump length measures 0.78 cm corresponding to gestational age of 6 weeks 5 days. No fetal heart tones are detected.   - Ultrasound here today (8/20) with collapsed gestational sac at 2.5cm, no fetal pole, no FHT  - Ultrasound findings confirming incomplete ab given absence of embryo and yolk sac on today's US  - Patient counseled on the risks/benefits/advantages on conservative management vs. Misoprostol vs. MVA vs. Suction D&C  - Patient strongly desires to return home and would like management with misoprostol   - Will send zofran 4mg, ibuprofen 600mg and misoprostol 800mcg per vag.   - Will give specific recommendations on how patient should take medications - premedicate 30 mins before first dose of misoprostol, then if no response, repeat 24 hours later  - Patient counseled on bleeding to expect and given return precautions  - Patient has follow up with Dilia Stone NP at 10AM on 8/22    Thank you for your consult. I will sign off. Please contact us if you have any additional questions.    Sol Miramontes MD  Obstetrics & Gynecology  [facility] - Emergency Dept

## 2024-08-22 LAB
FINAL PATHOLOGIC DIAGNOSIS: NORMAL
GROSS: NORMAL
Lab: NORMAL